# Patient Record
Sex: MALE | NOT HISPANIC OR LATINO | Employment: FULL TIME | URBAN - METROPOLITAN AREA
[De-identification: names, ages, dates, MRNs, and addresses within clinical notes are randomized per-mention and may not be internally consistent; named-entity substitution may affect disease eponyms.]

---

## 2018-02-01 ENCOUNTER — APPOINTMENT (OUTPATIENT)
Dept: ADMISSIONS | Facility: MEDICAL CENTER | Age: 23
End: 2018-02-01
Attending: OTOLARYNGOLOGY
Payer: COMMERCIAL

## 2018-02-01 RX ORDER — ASCORBIC ACID 500 MG
500 TABLET ORAL
COMMUNITY
End: 2019-05-05

## 2018-02-09 ENCOUNTER — HOSPITAL ENCOUNTER (OUTPATIENT)
Facility: MEDICAL CENTER | Age: 23
End: 2018-02-09
Attending: OTOLARYNGOLOGY | Admitting: OTOLARYNGOLOGY
Payer: COMMERCIAL

## 2018-02-09 VITALS
HEIGHT: 70 IN | HEART RATE: 56 BPM | BODY MASS INDEX: 22.95 KG/M2 | DIASTOLIC BLOOD PRESSURE: 65 MMHG | SYSTOLIC BLOOD PRESSURE: 122 MMHG | OXYGEN SATURATION: 98 % | RESPIRATION RATE: 16 BRPM | TEMPERATURE: 97.3 F | WEIGHT: 160.27 LBS

## 2018-02-09 PROCEDURE — 501838 HCHG SUTURE GENERAL: Performed by: OTOLARYNGOLOGY

## 2018-02-09 PROCEDURE — 160002 HCHG RECOVERY MINUTES (STAT): Performed by: OTOLARYNGOLOGY

## 2018-02-09 PROCEDURE — 700101 HCHG RX REV CODE 250

## 2018-02-09 PROCEDURE — 700111 HCHG RX REV CODE 636 W/ 250 OVERRIDE (IP)

## 2018-02-09 PROCEDURE — A6402 STERILE GAUZE <= 16 SQ IN: HCPCS | Performed by: OTOLARYNGOLOGY

## 2018-02-09 PROCEDURE — 160048 HCHG OR STATISTICAL LEVEL 1-5: Performed by: OTOLARYNGOLOGY

## 2018-02-09 PROCEDURE — 500331 HCHG COTTONOID, SURG PATTIE: Performed by: OTOLARYNGOLOGY

## 2018-02-09 PROCEDURE — 160041 HCHG SURGERY MINUTES - EA ADDL 1 MIN LEVEL 4: Performed by: OTOLARYNGOLOGY

## 2018-02-09 PROCEDURE — 160035 HCHG PACU - 1ST 60 MINS PHASE I: Performed by: OTOLARYNGOLOGY

## 2018-02-09 PROCEDURE — 160036 HCHG PACU - EA ADDL 30 MINS PHASE I: Performed by: OTOLARYNGOLOGY

## 2018-02-09 PROCEDURE — 88311 DECALCIFY TISSUE: CPT

## 2018-02-09 PROCEDURE — 160029 HCHG SURGERY MINUTES - 1ST 30 MINS LEVEL 4: Performed by: OTOLARYNGOLOGY

## 2018-02-09 PROCEDURE — A9270 NON-COVERED ITEM OR SERVICE: HCPCS

## 2018-02-09 PROCEDURE — 700102 HCHG RX REV CODE 250 W/ 637 OVERRIDE(OP)

## 2018-02-09 PROCEDURE — 502573 HCHG PACK, ENT: Performed by: OTOLARYNGOLOGY

## 2018-02-09 PROCEDURE — 110454 HCHG SHELL REV 250: Performed by: OTOLARYNGOLOGY

## 2018-02-09 PROCEDURE — 160009 HCHG ANES TIME/MIN: Performed by: OTOLARYNGOLOGY

## 2018-02-09 PROCEDURE — 501404 HCHG SPLINT, NASAL DOYLE AIRWAY: Performed by: OTOLARYNGOLOGY

## 2018-02-09 PROCEDURE — 500125 HCHG BOVIE, HANDLE: Performed by: OTOLARYNGOLOGY

## 2018-02-09 PROCEDURE — 88305 TISSUE EXAM BY PATHOLOGIST: CPT

## 2018-02-09 RX ORDER — LIDOCAINE HYDROCHLORIDE AND EPINEPHRINE 10; 10 MG/ML; UG/ML
INJECTION, SOLUTION INFILTRATION; PERINEURAL
Status: DISCONTINUED | OUTPATIENT
Start: 2018-02-09 | End: 2018-02-09 | Stop reason: HOSPADM

## 2018-02-09 RX ORDER — SODIUM CHLORIDE, SODIUM LACTATE, POTASSIUM CHLORIDE, CALCIUM CHLORIDE 600; 310; 30; 20 MG/100ML; MG/100ML; MG/100ML; MG/100ML
INJECTION, SOLUTION INTRAVENOUS CONTINUOUS
Status: DISCONTINUED | OUTPATIENT
Start: 2018-02-09 | End: 2018-02-09 | Stop reason: HOSPADM

## 2018-02-09 RX ORDER — OXYCODONE HCL 5 MG/5 ML
SOLUTION, ORAL ORAL
Status: COMPLETED
Start: 2018-02-09 | End: 2018-02-09

## 2018-02-09 RX ORDER — CEPHALEXIN 500 MG/1
500 CAPSULE ORAL 2 TIMES DAILY
Qty: 20 CAP | Refills: 0 | Status: SHIPPED | OUTPATIENT
Start: 2018-02-09 | End: 2019-05-05

## 2018-02-09 RX ORDER — OXYMETAZOLINE HYDROCHLORIDE 0.05 G/100ML
SPRAY NASAL
Status: DISCONTINUED
Start: 2018-02-09 | End: 2018-02-09 | Stop reason: HOSPADM

## 2018-02-09 RX ORDER — ACETAMINOPHEN 500 MG
1000 TABLET ORAL EVERY 6 HOURS
Status: DISCONTINUED | OUTPATIENT
Start: 2018-02-09 | End: 2018-02-09 | Stop reason: HOSPADM

## 2018-02-09 RX ORDER — LIDOCAINE HYDROCHLORIDE 10 MG/ML
INJECTION, SOLUTION EPIDURAL; INFILTRATION; INTRACAUDAL; PERINEURAL
Status: DISCONTINUED
Start: 2018-02-09 | End: 2018-02-09 | Stop reason: HOSPADM

## 2018-02-09 RX ORDER — EPINEPHRINE 1 MG/ML
INJECTION INTRAMUSCULAR; INTRAVENOUS; SUBCUTANEOUS
Status: DISCONTINUED
Start: 2018-02-09 | End: 2018-02-09 | Stop reason: HOSPADM

## 2018-02-09 RX ORDER — OXYMETAZOLINE HYDROCHLORIDE 0.05 G/100ML
SPRAY NASAL
Status: COMPLETED
Start: 2018-02-09 | End: 2018-02-09

## 2018-02-09 RX ORDER — BACITRACIN ZINC 500 [USP'U]/G
OINTMENT TOPICAL
Status: DISCONTINUED
Start: 2018-02-09 | End: 2018-02-09 | Stop reason: HOSPADM

## 2018-02-09 RX ORDER — MIDAZOLAM HYDROCHLORIDE 1 MG/ML
INJECTION INTRAMUSCULAR; INTRAVENOUS
Status: DISCONTINUED
Start: 2018-02-09 | End: 2018-02-09 | Stop reason: HOSPADM

## 2018-02-09 RX ADMIN — OXYMETAZOLINE HYDROCHLORIDE 2 SPRAY: 5 SPRAY NASAL at 08:15

## 2018-02-09 RX ADMIN — SODIUM CHLORIDE, SODIUM LACTATE, POTASSIUM CHLORIDE, CALCIUM CHLORIDE 1000 ML: 600; 310; 30; 20 INJECTION, SOLUTION INTRAVENOUS at 08:15

## 2018-02-09 RX ADMIN — OXYCODONE HYDROCHLORIDE 5 MG: 5 SOLUTION ORAL at 11:55

## 2018-02-09 ASSESSMENT — PAIN SCALES - GENERAL
PAINLEVEL_OUTOF10: 4
PAINLEVEL_OUTOF10: 4
PAINLEVEL_OUTOF10: 5
PAINLEVEL_OUTOF10: 4
PAINLEVEL_OUTOF10: 0
PAINLEVEL_OUTOF10: 4

## 2018-02-09 NOTE — OR SURGEON
Immediate Post OP Note    PreOp Diagnosis: persistent sinusitis    PostOp Diagnosis: same    Procedure(s):  TURBINATE REDUCTION - RESECTION WITH SUBMUCOSAL APPROACH - Wound Class: Clean Contaminated  ETHMOIDECTOMY - ENDOSCOPIC, PARTIAL (ANTERIOR) - Wound Class: Clean Contaminated  ANTROSTOMY - ENDOSCOPIC MAXILLARY WITH MAXILLARY TISSUE REMOVAL - Wound Class: Clean Contaminated    Surgeon(s):  Aston Pool M.D.    Anesthesiologist/Type of Anesthesia:  Anesthesiologist: Brina Dia M.D./General    Surgical Staff:  Circulator: Jackelyn Cooper R.N.  Scrub Person: Sobeida Thomas    Specimens:  * No specimens in log *    Estimated Blood Loss: 30 ml    Findings: windows occluded    Complications: none        2/9/2018 11:11 AM Aston Pool

## 2018-02-09 NOTE — OR NURSING
1110 Pt arrived from OR with Dr. Dia.  Pt VSS, PIV infusing without issue. Pt with mild bloody drainage from nose, drip pad provided.    1150 pt waking up, tolerating sips of ginger ale well.  Pt denies nausea, admits to pain in nose.  Medicated with PO percocet.    1157 Mother brought to bedside.   1202 Pt medicated with IV fentanyl per order.   1220 Pt medicated again with IV fentanyl per order.   1240 Discharge instructions reviewed with pt and mother.  Answered all questions and concerns.   1252 Pt up to side of bed to get dressed, tolerated well.   1300 Pt meets d/c criteria, feels ready to go home.  PIV removed, pt tolerated well.    1305 Pt declined wheelchair and left ambulating with family to d/c home.

## 2018-02-09 NOTE — DISCHARGE INSTRUCTIONS
ACTIVITY: Rest and take it easy for the first 24 hours.  A responsible adult is recommended to remain with you during that time.  It is normal to feel sleepy.  We encourage you to not do anything that requires balance, judgment or coordination.    MILD FLU-LIKE SYMPTOMS ARE NORMAL. YOU MAY EXPERIENCE GENERALIZED MUSCLE ACHES, THROAT IRRITATION, HEADACHE AND/OR SOME NAUSEA.    FOR 24 HOURS DO NOT:  Drive, operate machinery or run household appliances.  Drink beer or alcoholic beverages.   Make important decisions or sign legal documents.    SPECIAL INSTRUCTIONS: SEE ATTACHED SHEET    DIET: To avoid nausea, slowly advance diet as tolerated, avoiding spicy or greasy foods for the first day.  Add more substantial food to your diet according to your physician's instructions.  Babies can be fed formula or breast milk as soon as they are hungry.  INCREASE FLUIDS AND FIBER TO AVOID CONSTIPATION.    SURGICAL DRESSING/BATHING: May shower starting tomorrow.      FOLLOW-UP APPOINTMENT:  A follow-up appointment should be arranged with your doctor in as scheduled; call to schedule.    You should CALL YOUR PHYSICIAN if you develop:  Fever greater than 101 degrees F.  Pain not relieved by medication, or persistent nausea or vomiting.  Excessive bleeding (blood soaking through dressing) or unexpected drainage from the wound.  Extreme redness or swelling around the incision site, drainage of pus or foul smelling drainage.  Inability to urinate or empty your bladder within 8 hours.  Problems with breathing or chest pain.    You should call 911 if you develop problems with breathing or chest pain.  If you are unable to contact your doctor or surgical center, you should go to the nearest emergency room or urgent care center.  Physician's telephone #: [phone]    If any questions arise, call your doctor.  If your doctor is not available, please feel free to call the Surgical Center at [phone].  The Center is open Monday  through Friday from 7AM to 7PM.  You can also call the HEALTH HOTLINE open 24 hours/day, 7 days/week and speak to a nurse at [phone], or toll free at [phone].    A registered nurse may call you a few days after your surgery to see how you are doing after your procedure.    MEDICATIONS: Resume taking daily medication.  Take prescribed pain medication with food.  If no medication is prescribed, you may take non-aspirin pain medication if needed.  PAIN MEDICATION CAN BE VERY CONSTIPATING.  Take a stool softener or laxative such as senokot, pericolace, or milk of magnesia if needed.    Prescription given for Keflex (2 times daily) and _______________ .  Last pain medication given at 11:50 am (next dose after 3:50 pm).    If your physician has prescribed pain medication that includes Acetaminophen (Tylenol), do not take additional Acetaminophen (Tylenol) while taking the prescribed medication.    Depression / Suicide Risk    As you are discharged from this [facility] facility, it is important to learn how to keep safe from harming yourself.    Recognize the warning signs:  · Abrupt changes in personality, positive or negative- including increase in energy   · Giving away possessions  · Change in eating patterns- significant weight changes-  positive or negative  · Change in sleeping patterns- unable to sleep or sleeping all the time   · Unwillingness or inability to communicate  · Depression  · Unusual sadness, discouragement and loneliness  · Talk of wanting to die  · Neglect of personal appearance   · Rebelliousness- reckless behavior  · Withdrawal from people/activities they love  · Confusion- inability to concentrate     If you or a loved one observes any of these behaviors or has concerns about self-harm, here's what you can do:  · Talk about it- your feelings and reasons for harming yourself  · Remove any means that you might use to hurt yourself (examples: pills, rope, extension cords,  firearm)  · Get professional help from the community (Mental Health, Substance Abuse, psychological counseling)  · Do not be alone:Call your Safe Contact- someone whom you trust who will be there for you.  · Call your local CRISIS HOTLINE 203-7982 or 160-514-4295  · Call your local Children's Mobile Crisis Response Team [place] (113) 795-7600 or www.WePopp  · Call the toll free National Suicide Prevention Hotlines   · National Suicide Prevention Lifeline 998-840-CXIS (4392)  · National Hope Line Network 800-SUICIDE (145-5428)

## 2018-02-16 NOTE — OP REPORT
DATE OF SERVICE:  02/09/2018    PREOPERATIVE DIAGNOSES:  Bilateral sinusitis and bilateral middle turbinate   hypertrophy.    POSTOPERATIVE DIAGNOSES:  Bilateral sinusitis and bilateral middle turbinate   hypertrophy.    PROCEDURES PERFORMED:  Bilateral submucous resection of the middle turbinates,   bilateral endoscopic ethmoidectomy, and bilateral endoscopic maxillary   antrostomy with tissue removal.    SURGEON:  Aston Pool MD    ANESTHESIA:  General.    INDICATIONS:  The patient is a very nice 22-year-old male who has had a   previous septoplasty and some previous sinus surgery.  The middle turbinate   has lateralized over his windows and he is getting recurrent sinusitis.    DESCRIPTION OF PROCEDURE:  The patient was placed on the operating room table   in supine position.  After adequate general anesthesia was administered, the   right and left nasal cavities were anesthetized with 1% lidocaine with   1:100,000 epinephrine using approximately 6 mL with a 25-gauge spinal needle.    Afrin-soaked pledgets were placed in the nose bilaterally.  The right and   left middle turbinates were trimmed in a submucous fashion using endoscopic   scissors to the right and left and a straight and upbiting cutting forceps.    This uncovered the ostiomeatal complex area and the natural ostia.  The   natural ostia was widened with a straight cutting forceps posteriorly and a   sidebiter and backbiter posteriorly.  The window was widened to approximately   8 mm x 1 cm and tissue was removed from the intramaxillary sinus on both   sides.  The anterior ethmoid was accentuated with a Galloway tip suction and   ethmoidectomy was then performed.  Surgiflo was placed in the surgical area   and the patient was awoken from anesthesia and brought to the recovery room in   satisfactory condition.  There were no intraoperative complications.       ____________________________________     MD KEENAN GRADY / EJ    DD:   02/16/2018 10:40:11  DT:  02/16/2018 11:17:57    D#:  [id number]  Job#:  [id number]

## 2019-01-30 ENCOUNTER — OFFICE VISIT (OUTPATIENT)
Dept: URGENT CARE | Facility: MEDICAL CENTER | Age: 24
End: 2019-01-30
Payer: COMMERCIAL

## 2019-01-30 VITALS
WEIGHT: 181 LBS | SYSTOLIC BLOOD PRESSURE: 140 MMHG | DIASTOLIC BLOOD PRESSURE: 80 MMHG | RESPIRATION RATE: 16 BRPM | TEMPERATURE: 98.5 F | BODY MASS INDEX: 25.97 KG/M2 | OXYGEN SATURATION: 97 % | HEART RATE: 89 BPM

## 2019-01-30 DIAGNOSIS — J02.9 VIRAL PHARYNGITIS: ICD-10-CM

## 2019-01-30 LAB
INT CON NEG: NEGATIVE
INT CON POS: POSITIVE
S PYO AG THROAT QL: NEGATIVE

## 2019-01-30 PROCEDURE — 87880 STREP A ASSAY W/OPTIC: CPT | Performed by: PHYSICIAN ASSISTANT

## 2019-01-30 PROCEDURE — 99203 OFFICE O/P NEW LOW 30 MIN: CPT | Performed by: PHYSICIAN ASSISTANT

## 2019-01-30 ASSESSMENT — ENCOUNTER SYMPTOMS
ABDOMINAL PAIN: 0
NAUSEA: 0
SWOLLEN GLANDS: 1
EYE PAIN: 0
HEADACHES: 0
FEVER: 1
BLURRED VISION: 0
VOMITING: 0
PALPITATIONS: 0
MYALGIAS: 1
DIZZINESS: 0
SORE THROAT: 1
COUGH: 0
SHORTNESS OF BREATH: 0
CONSTIPATION: 0
DIARRHEA: 0

## 2019-01-30 NOTE — LETTER
January 30, 2019         Patient: Kali Merino   YOB: 1995   Date of Visit: 1/30/2019           To Whom it May Concern:    Kali Merino was seen in my clinic on 1/30/2019.     If you have any questions or concerns, please don't hesitate to call.        Sincerely,           Lisa Arriaga P.A.-C.  Electronically Signed

## 2019-01-30 NOTE — PROGRESS NOTES
Subjective:      Kali Merino is a 23 y.o. male who presents with Pharyngitis (5 days)      Pharyngitis    This is a new problem. The current episode started in the past 7 days (Started 4-5 days ago). The problem has been unchanged. Neither side of throat is experiencing more pain than the other. Maximum temperature: Subjective fever. The pain is mild. Associated symptoms include congestion and swollen glands. Pertinent negatives include no abdominal pain, coughing, diarrhea, drooling, ear pain, headaches, plugged ear sensation, shortness of breath or vomiting. He has had no exposure to strep or mono. He has tried nothing for the symptoms.       Review of Systems   Constitutional: Positive for fever (Subjective) and malaise/fatigue.   HENT: Positive for congestion and sore throat. Negative for drooling and ear pain.    Eyes: Negative for blurred vision and pain.   Respiratory: Negative for cough and shortness of breath.    Cardiovascular: Negative for chest pain and palpitations.   Gastrointestinal: Negative for abdominal pain, constipation, diarrhea, nausea and vomiting.   Musculoskeletal: Positive for myalgias.   Skin: Negative for rash.   Neurological: Negative for dizziness and headaches.       PMH:  has a past medical history of Anesthesia; Bronchitis (1/15/15); Chronic sinus infection (1/15/15); Heart murmur; Indigestion; and Palpitations. He also has no past medical history of Abnormal EKG; Hypertension; Hypertrophic cardiomyopathy (HCC); PSVT (paroxysmal supraventricular tachycardia) ([facility]); or Syncope.  MEDS:   Current Outpatient Prescriptions:   •  ascorbic acid (ASCORBIC ACID) 500 MG Tab, Take 500 mg by mouth 2 Times a Day., Disp: , Rfl:   •  MAGNESIUM-ZINC PO, Take  by mouth every day., Disp: , Rfl:   •  cephALEXin (KEFLEX) 500 MG Cap, Take 1 Cap by mouth 2 times a day. (Patient not taking: Reported on 1/30/2019), Disp: 20 Cap, Rfl: 0  •  Fluticasone Propionate (FLONASE NA), Spray  in nose as  needed., Disp: , Rfl:   ALLERGIES: No Known Allergies  SURGHX:   Past Surgical History:   Procedure Laterality Date   • TURBINATE REDUCTION Bilateral 2/9/2018    Procedure: TURBINATE REDUCTION - RESECTION WITH SUBMUCOSAL APPROACH;  Surgeon: Aston Pool M.D.;  Location: SURGERY SAME DAY [facility];  Service: Ent   • ETHMOIDECTOMY Bilateral 2/9/2018    Procedure: ETHMOIDECTOMY - ENDOSCOPIC, PARTIAL (ANTERIOR);  Surgeon: Aston Pool M.D.;  Location: SURGERY SAME DAY [facility];  Service: Ent   • ANTROSTOMY Bilateral 2/9/2018    Procedure: ANTROSTOMY - ENDOSCOPIC MAXILLARY WITH MAXILLARY TISSUE REMOVAL;  Surgeon: Aston Pool M.D.;  Location: SURGERY SAME DAY [facility];  Service: Ent   • SEPTOPLASTY  1/16/2015    Performed by Bere Hickey M.D. at [facility]   • TURBINOPLASTY  1/16/2015    Performed by Bere Hickey M.D. at [facility]   • SPHENOIDECTOMY  1/16/2015    Performed by Bere Hickey M.D. at [facility]   • ANTROSTOMY  1/16/2015    Performed by Bere Hickey M.D. at [facility]   • ETHMOIDECTOMY  1/16/2015    Performed by Bere Hickey M.D. at [facility]   • NODULE EXCISION  2007    vocal cord   • TONSILLECTOMY AND ADENOIDECTOMY  2001   • MYRINGOTOMY  2000    with tubes     SOCHX:  reports that he has never smoked. He has never used smokeless tobacco. He reports that he uses drugs, including Marijuana. He reports that he does not drink alcohol.  FH: Family history was reviewed, no pertinent findings to report     Objective:     /80   Pulse 89   Temp 36.9 °C (98.5 °F)   Resp 16   Wt 82.1 kg (181 lb)   SpO2 97%   BMI 25.97 kg/m²      Physical Exam   Constitutional: He is oriented to person, place, and time. He appears well-developed and well-nourished.   HENT:   Head: Normocephalic and atraumatic.   Right Ear: Tympanic membrane, external ear and ear canal normal.   Left Ear: Tympanic  membrane, external ear and ear canal normal.   Nose: Mucosal edema present. Right sinus exhibits no maxillary sinus tenderness and no frontal sinus tenderness. Left sinus exhibits no maxillary sinus tenderness and no frontal sinus tenderness.   Mouth/Throat: Uvula is midline and mucous membranes are normal. Posterior oropharyngeal erythema present.   Patient does not have tonsils   Eyes: Pupils are equal, round, and reactive to light. Conjunctivae are normal.   Neck: Normal range of motion.   Cardiovascular: Normal rate, regular rhythm and normal heart sounds.    No murmur heard.  Pulmonary/Chest: Effort normal and breath sounds normal. He has no wheezes.   Lymphadenopathy:     He has cervical adenopathy.   Neurological: He is alert and oriented to person, place, and time.   Skin: Skin is warm and dry. Capillary refill takes less than 2 seconds.   Psychiatric: He has a normal mood and affect. His behavior is normal. Judgment normal.   Vitals reviewed.      POCT Rapid Strep A - Negative    Assessment/Plan:     1. Viral pharyngitis  - POCT Rapid Strep A  - Supportive care discussed to include salt water gargles, throat lozenges, and increased fluid intake        Differential Diagnosis, natural history, and supportive care discussed. Return to the Urgent Care or follow up with your PCP if symptoms fail to resolve, or for any new or worsening symptoms. Emergency room precautions discussed. Patient and/or family appears understanding of information.

## 2019-05-05 PROBLEM — R09.02 HYPOXIA: Status: ACTIVE | Noted: 2019-05-05

## 2019-05-05 PROBLEM — F19.90 DRUG USE: Status: ACTIVE | Noted: 2019-05-05

## 2019-05-05 PROBLEM — F10.10 EXCESSIVE DRINKING OF ALCOHOL: Status: ACTIVE | Noted: 2019-05-05

## 2019-05-06 PROBLEM — F19.90 DRUG USE: Status: RESOLVED | Noted: 2019-05-05 | Resolved: 2019-05-06

## 2019-05-06 PROBLEM — F10.10 EXCESSIVE DRINKING OF ALCOHOL: Status: RESOLVED | Noted: 2019-05-05 | Resolved: 2019-05-06

## 2019-05-06 PROBLEM — R09.02 HYPOXIA: Status: RESOLVED | Noted: 2019-05-05 | Resolved: 2019-05-06

## 2021-05-26 ENCOUNTER — HOSPITAL ENCOUNTER (OUTPATIENT)
Dept: HOSPITAL 8 - CFH | Age: 26
Discharge: HOME | End: 2021-05-26
Attending: PHYSICIAN ASSISTANT
Payer: COMMERCIAL

## 2021-05-26 DIAGNOSIS — N63.42: ICD-10-CM

## 2021-05-26 DIAGNOSIS — R22.2: ICD-10-CM

## 2021-05-26 DIAGNOSIS — N62: Primary | ICD-10-CM

## 2021-05-26 PROCEDURE — G0279 TOMOSYNTHESIS, MAMMO: HCPCS

## 2021-05-26 PROCEDURE — 76642 ULTRASOUND BREAST LIMITED: CPT

## 2021-05-26 PROCEDURE — 77062 BREAST TOMOSYNTHESIS BI: CPT

## 2022-03-11 ENCOUNTER — OFFICE VISIT (OUTPATIENT)
Dept: URGENT CARE | Facility: CLINIC | Age: 27
End: 2022-03-11
Payer: COMMERCIAL

## 2022-03-11 VITALS
OXYGEN SATURATION: 96 % | HEART RATE: 86 BPM | WEIGHT: 195.2 LBS | SYSTOLIC BLOOD PRESSURE: 134 MMHG | TEMPERATURE: 99 F | BODY MASS INDEX: 27.94 KG/M2 | RESPIRATION RATE: 16 BRPM | HEIGHT: 70 IN | DIASTOLIC BLOOD PRESSURE: 74 MMHG

## 2022-03-11 DIAGNOSIS — J32.9 BACTERIAL SINUSITIS: ICD-10-CM

## 2022-03-11 DIAGNOSIS — J20.9 ACUTE BRONCHITIS, UNSPECIFIED ORGANISM: ICD-10-CM

## 2022-03-11 DIAGNOSIS — B96.89 BACTERIAL SINUSITIS: ICD-10-CM

## 2022-03-11 PROCEDURE — 99214 OFFICE O/P EST MOD 30 MIN: CPT

## 2022-03-11 RX ORDER — AMOXICILLIN AND CLAVULANATE POTASSIUM 875; 125 MG/1; MG/1
1 TABLET, FILM COATED ORAL 2 TIMES DAILY
Qty: 10 TABLET | Refills: 0 | Status: SHIPPED | OUTPATIENT
Start: 2022-03-11 | End: 2022-03-16

## 2022-03-11 RX ORDER — PREDNISONE 20 MG/1
20 TABLET ORAL DAILY
Qty: 5 TABLET | Refills: 0 | Status: SHIPPED | OUTPATIENT
Start: 2022-03-11 | End: 2022-03-16

## 2022-03-11 RX ORDER — DEXTROAMPHETAMINE SACCHARATE, AMPHETAMINE ASPARTATE MONOHYDRATE, DEXTROAMPHETAMINE SULFATE AND AMPHETAMINE SULFATE 3.75; 3.75; 3.75; 3.75 MG/1; MG/1; MG/1; MG/1
15 CAPSULE, EXTENDED RELEASE ORAL EVERY MORNING
COMMUNITY
Start: 2022-03-08

## 2022-03-11 ASSESSMENT — ENCOUNTER SYMPTOMS
HOARSE VOICE: 0
HEADACHES: 1
CHILLS: 0
SINUS PRESSURE: 1
COUGH: 1
NAUSEA: 0
SHORTNESS OF BREATH: 0
VOMITING: 0
MUSCULOSKELETAL NEGATIVE: 1
SORE THROAT: 1

## 2022-03-12 NOTE — PROGRESS NOTES
Subjective     Kali Merino is a 26 y.o. male who presents with Sinusitis (5x weeks, post nasal drip) and Otalgia (Both, left more than right /)    HPI:    Patient presents with nasal congestion and runny nose that started 5 weeks ago. Three weeks ago, ear pain bilateral with left more. This is also accompanied by sore throat, and post nasal drip. He reports sinus pressure, used Sudafed with some relief.  He further reports occasional SOB and/or wheezes he denies any fever chills, nausea, vomiting, diarrhea.  He reports having COVID in January 2022. Patient reports revious sinus surgery.        Sinusitis  This is a chronic problem. The current episode started more than 1 month ago. The problem has been gradually worsening since onset. There has been no fever. Associated symptoms include coughing (on waking up), ear pain, headaches (sinus), sinus pressure and a sore throat. Pertinent negatives include no chills, hoarse voice or shortness of breath. Past treatments include saline sprays. The treatment provided no relief.   Otalgia   Associated symptoms include coughing (on waking up), headaches (sinus) and a sore throat. Pertinent negatives include no vomiting.     PMH:  has a past medical history of Anesthesia, Bronchitis (1/15/15), Chronic sinus infection (1/15/15), Heart murmur, Indigestion, and Palpitations.    He has no past medical history of Abnormal EKG, Hypertension, Hypertrophic cardiomyopathy (HCC), PSVT (paroxysmal supraventricular tachycardia) ([facility]), or Syncope.  MEDS:   Current Outpatient Medications:   •  amphetamine-dextroamphetamine (ADDERALL XR) 15 MG XR capsule, Take 15 mg by mouth every morning. Take 1 capsule by mouth every morning, Disp: , Rfl:   •  amoxicillin-clavulanate (AUGMENTIN) 875-125 MG Tab, Take 1 Tablet by mouth 2 times a day for 5 days., Disp: 10 Tablet, Rfl: 0  •  predniSONE (DELTASONE) 20 MG Tab, Take 1 Tablet by mouth every day for 5 days., Disp: 5 Tablet, Rfl:  0  ALLERGIES: No Known Allergies  SURGHX:   Past Surgical History:   Procedure Laterality Date   • TURBINATE REDUCTION Bilateral 2/9/2018    Procedure: TURBINATE REDUCTION - RESECTION WITH SUBMUCOSAL APPROACH;  Surgeon: Aston Pool M.D.;  Location: SURGERY SAME DAY [facility];  Service: Ent   • ETHMOIDECTOMY Bilateral 2/9/2018    Procedure: ETHMOIDECTOMY - ENDOSCOPIC, PARTIAL (ANTERIOR);  Surgeon: Aston Pool M.D.;  Location: SURGERY SAME DAY [facility];  Service: Ent   • ANTROSTOMY Bilateral 2/9/2018    Procedure: ANTROSTOMY - ENDOSCOPIC MAXILLARY WITH MAXILLARY TISSUE REMOVAL;  Surgeon: Aston Pool M.D.;  Location: SURGERY SAME DAY [facility];  Service: Ent   • SEPTOPLASTY  1/16/2015    Performed by Bere Hickey M.D. at [facility]   • TURBINOPLASTY  1/16/2015    Performed by Bere Hickey M.D. at [facility]   • SPHENOIDECTOMY  1/16/2015    Performed by Bere Hickey M.D. at [facility]   • ANTROSTOMY  1/16/2015    Performed by Bere Hickey M.D. at [facility]   • ETHMOIDECTOMY  1/16/2015    Performed by Bere Hickey M.D. at [facility]   • NODULE EXCISION  2007    vocal cord   • TONSILLECTOMY AND ADENOIDECTOMY  2001   • MYRINGOTOMY  2000    with tubes     SOCHX:  reports that he has never smoked. He has never used smokeless tobacco. He reports current alcohol use. He reports current drug use. Drug: Marijuana.  FH: Reviewed with patient, not pertinent to this visit.       Review of Systems   Constitutional: Negative for chills.   HENT: Positive for ear pain, sinus pressure and sore throat. Negative for hoarse voice.    Respiratory: Positive for cough (on waking up). Negative for shortness of breath.    Gastrointestinal: Negative for nausea and vomiting.   Musculoskeletal: Negative.    Neurological: Positive for headaches (sinus).         Objective     /74 (BP Location: Right arm, Patient  "Position: Sitting, BP Cuff Size: Adult long)   Pulse 86   Temp 37.2 °C (99 °F) (Temporal)   Resp 16   Ht 1.778 m (5' 10\")   Wt 88.5 kg (195 lb 3.2 oz)   SpO2 96%   BMI 28.01 kg/m²      Physical Exam  HENT:      Right Ear: Tympanic membrane, ear canal and external ear normal.      Left Ear: Tympanic membrane, ear canal and external ear normal.      Nose: Congestion and rhinorrhea present.      Right Sinus: Maxillary sinus tenderness present. No frontal sinus tenderness.      Left Sinus: Maxillary sinus tenderness present. No frontal sinus tenderness.   Eyes:      Extraocular Movements: Extraocular movements intact.   Cardiovascular:      Rate and Rhythm: Normal rate and regular rhythm.      Pulses: Normal pulses.      Heart sounds: Normal heart sounds.   Pulmonary:      Effort: Pulmonary effort is normal.      Breath sounds: Normal breath sounds.   Musculoskeletal:         General: Normal range of motion.      Cervical back: Normal range of motion.   Skin:     General: Skin is warm and dry.   Neurological:      General: No focal deficit present.      Mental Status: He is alert.   Psychiatric:         Mood and Affect: Mood normal.         Behavior: Behavior normal.              Assessment & Plan        1. Bacterial sinusitis    - amoxicillin-clavulanate (AUGMENTIN) 875-125 MG Tab; Take 1 Tablet by mouth 2 times a day for 5 days.  Dispense: 10 Tablet; Refill: 0    2. Acute bronchitis, unspecified organism    Patient's presentation is consistent with bacterial sinusitis and acute bronchitis.  Discussed treatment plan, patient is agreeable and verbalized understanding.  Patient is started on oral antibiotics.  He is placed on oral steroids, as he had responded to this positively in the past.     Discussed supportive treatment at home:  Rest, increase oral fluid intake, use of humidifier, steam inhalation, warm saline gargles.  OTC Tylenol or Motrin for fever/discomfort.  OTC Supportive Care for Congestion - saline " nasal spray or neti pot     Follow-up with PCP      Return to clinic or go to the ED if symptoms worsen or fail to improve, or if the patient should develop worsening/increasing cough, persistent shortness of breath, wheezing, chest pain, ear pain, sore throat, fever/chills, and/or any concerning symptoms.     Electronically Signed by LOLA Rodríguez

## 2022-04-05 ENCOUNTER — APPOINTMENT (OUTPATIENT)
Dept: URGENT CARE | Facility: CLINIC | Age: 27
End: 2022-04-05
Payer: COMMERCIAL

## 2022-04-06 ENCOUNTER — OFFICE VISIT (OUTPATIENT)
Dept: URGENT CARE | Facility: CLINIC | Age: 27
End: 2022-04-06
Payer: COMMERCIAL

## 2022-04-06 VITALS
DIASTOLIC BLOOD PRESSURE: 76 MMHG | HEIGHT: 70 IN | WEIGHT: 195 LBS | RESPIRATION RATE: 14 BRPM | BODY MASS INDEX: 27.92 KG/M2 | OXYGEN SATURATION: 99 % | SYSTOLIC BLOOD PRESSURE: 114 MMHG | HEART RATE: 55 BPM | TEMPERATURE: 97.7 F

## 2022-04-06 DIAGNOSIS — J01.40 ACUTE PANSINUSITIS, RECURRENCE NOT SPECIFIED: ICD-10-CM

## 2022-04-06 PROCEDURE — 99214 OFFICE O/P EST MOD 30 MIN: CPT | Performed by: NURSE PRACTITIONER

## 2022-04-06 RX ORDER — METHYLPREDNISOLONE 4 MG/1
TABLET ORAL
Qty: 21 TABLET | Refills: 0 | Status: SHIPPED | OUTPATIENT
Start: 2022-04-06

## 2022-04-06 RX ORDER — DOXYCYCLINE HYCLATE 100 MG
100 TABLET ORAL 2 TIMES DAILY
Qty: 20 TABLET | Refills: 0 | Status: SHIPPED | OUTPATIENT
Start: 2022-04-06 | End: 2022-04-16

## 2022-04-06 RX ORDER — DEXTROAMPHETAMINE SACCHARATE, AMPHETAMINE ASPARTATE MONOHYDRATE, DEXTROAMPHETAMINE SULFATE AND AMPHETAMINE SULFATE 2.5; 2.5; 2.5; 2.5 MG/1; MG/1; MG/1; MG/1
CAPSULE, EXTENDED RELEASE ORAL
COMMUNITY
End: 2022-04-06

## 2022-04-06 ASSESSMENT — ENCOUNTER SYMPTOMS
CHILLS: 0
NAUSEA: 0
SORE THROAT: 1
FEVER: 0
COUGH: 0
SENSORY CHANGE: 0
PALPITATIONS: 0
EYE DISCHARGE: 0
WHEEZING: 0
MYALGIAS: 0
DIZZINESS: 0
SINUS PAIN: 1
EYE REDNESS: 0
SHORTNESS OF BREATH: 0
HEADACHES: 1

## 2022-04-06 ASSESSMENT — LIFESTYLE VARIABLES: SUBSTANCE_ABUSE: 0

## 2022-04-06 NOTE — PROGRESS NOTES
Kali Merino is a 26 y.o. male who presents for Sinus Problem (X3 weeks ) and Nasal Congestion      HPI Kali is a 26-year-old male who presents with sinus pain and pressure with nasal congestion, headache, thick drainage in the back of his throat x3 weeks.  He was seen previously in urgent care and given a 5-day course of Augmentin ( 03/11/22) .  He initially felt better but as soon as he stopped the Augmentin his symptoms returned and are worse this time.  He is using a humidifier and an over-the-counter nasal steroid spray with intermittent Benadryl.  He is also done some sinus irrigations as needed.  He denies fever, chills, dizziness.  He has a history of bacterial sinusitis.  He has a history of septoplasty and sinus surgery.  He also has seasonal allergies.    Review of Systems   Constitutional: Negative for chills, fever and malaise/fatigue.   HENT: Positive for congestion, sinus pain and sore throat. Negative for ear pain.    Eyes: Negative for discharge and redness.   Respiratory: Negative for cough, shortness of breath and wheezing.    Cardiovascular: Negative for chest pain and palpitations.   Gastrointestinal: Negative for nausea.   Musculoskeletal: Negative for myalgias.   Neurological: Positive for headaches. Negative for dizziness and sensory change.   Endo/Heme/Allergies: Positive for environmental allergies.   Psychiatric/Behavioral: Negative for substance abuse.       Allergies:     No Known Allergies    PMSFS Hx:  Past Medical History:   Diagnosis Date   • Anesthesia     PONV   • Bronchitis 1/15/15    reports hx of   • Chronic sinus infection 1/15/15    being treated currently   • Heart murmur    • Indigestion    • Palpitations      Past Surgical History:   Procedure Laterality Date   • TURBINATE REDUCTION Bilateral 2/9/2018    Procedure: TURBINATE REDUCTION - RESECTION WITH SUBMUCOSAL APPROACH;  Surgeon: Aston Pool M.D.;  Location: SURGERY SAME DAY [facility];  Service:  Ent   • ETHMOIDECTOMY Bilateral 2/9/2018    Procedure: ETHMOIDECTOMY - ENDOSCOPIC, PARTIAL (ANTERIOR);  Surgeon: Aston Pool M.D.;  Location: SURGERY SAME DAY [facility];  Service: Ent   • ANTROSTOMY Bilateral 2/9/2018    Procedure: ANTROSTOMY - ENDOSCOPIC MAXILLARY WITH MAXILLARY TISSUE REMOVAL;  Surgeon: Aston Pool M.D.;  Location: SURGERY SAME DAY [facility];  Service: Ent   • SEPTOPLASTY  1/16/2015    Performed by Bere Hickey M.D. at [facility]   • TURBINOPLASTY  1/16/2015    Performed by Bere Hickey M.D. at [facility]   • SPHENOIDECTOMY  1/16/2015    Performed by Bere Hickey M.D. at [facility]   • ANTROSTOMY  1/16/2015    Performed by Bere Hickey M.D. at [facility]   • ETHMOIDECTOMY  1/16/2015    Performed by Bere Hickey M.D. at [facility]   • NODULE EXCISION  2007    vocal cord   • TONSILLECTOMY AND ADENOIDECTOMY  2001   • MYRINGOTOMY  2000    with tubes     Family History   Problem Relation Age of Onset   • Thyroid Sister    • Heart Disease Other    • Hypertension Other      Social History     Tobacco Use   • Smoking status: Never Smoker   • Smokeless tobacco: Never Used   Substance Use Topics   • Alcohol use: Yes     Comment: occ       Problems:   Patient Active Problem List   Diagnosis   • Palpitations   • Tachycardia   • Hyperthyroidism   • Deviated nasal septum       Medications:   Current Outpatient Medications on File Prior to Visit   Medication Sig Dispense Refill   • amphetamine-dextroamphetamine (ADDERALL XR) 15 MG XR capsule Take 15 mg by mouth every morning. Take 1 capsule by mouth every morning     • amphetamine-dextroamphetamine XR (ADDERALL XR) 10 MG CAPSULE SR 24 HR Adderall XR 10 mg capsule,extended release   TAKE 1 CAPSULE BY MOUTH ONCE DAILY IN THE MORNING (Patient not taking: Reported on 4/6/2022)       No current facility-administered medications on file prior to visit.  "         Objective:     /76   Pulse (!) 55   Temp 36.5 °C (97.7 °F) (Temporal)   Resp 14   Ht 1.778 m (5' 10\")   Wt 88.5 kg (195 lb)   SpO2 99%   BMI 27.98 kg/m²     Physical Exam  Vitals and nursing note reviewed.   Constitutional:       General: He is not in acute distress.     Appearance: He is well-developed, well-groomed and normal weight. He is not toxic-appearing.   HENT:      Head: Normocephalic.      Right Ear: Hearing, tympanic membrane, ear canal and external ear normal.      Left Ear: Hearing, tympanic membrane, ear canal and external ear normal.      Nose:      Right Sinus: Frontal sinus tenderness present. No maxillary sinus tenderness.      Left Sinus: Frontal sinus tenderness present. No maxillary sinus tenderness.      Mouth/Throat:      Lips: Pink.      Mouth: Mucous membranes are moist.      Pharynx: Uvula midline. Oropharyngeal exudate present. No posterior oropharyngeal erythema.      Tonsils: No tonsillar abscesses.   Eyes:      General: Lids are normal.      Conjunctiva/sclera: Conjunctivae normal.   Neck:      Trachea: Trachea normal.   Cardiovascular:      Rate and Rhythm: Normal rate and regular rhythm.      Chest Wall: PMI is not displaced.      Pulses: Normal pulses.   Pulmonary:      Effort: Pulmonary effort is normal.      Breath sounds: Normal breath sounds.   Chest:   Breasts:      Right: No supraclavicular adenopathy.      Left: No supraclavicular adenopathy.       Abdominal:      Palpations: Abdomen is soft.   Musculoskeletal:      Cervical back: Full passive range of motion without pain, normal range of motion and neck supple.   Lymphadenopathy:      Head:      Right side of head: No tonsillar adenopathy.      Left side of head: No tonsillar adenopathy.      Cervical: No cervical adenopathy.      Upper Body:      Right upper body: No supraclavicular adenopathy.      Left upper body: No supraclavicular adenopathy.   Skin:     General: Skin is warm and dry.      " Capillary Refill: Capillary refill takes less than 2 seconds.   Neurological:      Mental Status: He is alert and oriented to person, place, and time.   Psychiatric:         Mood and Affect: Mood normal.         Speech: Speech normal.         Behavior: Behavior normal. Behavior is cooperative.         Thought Content: Thought content normal.         Assessment /Associated Orders:      1. Acute pansinusitis, recurrence not specified  doxycycline (VIBRAMYCIN) 100 MG Tab    methylPREDNISolone (MEDROL DOSEPAK) 4 MG Tablet Therapy Pack       Medical Decision Making:    Pt is clinically stable at today's acute urgent care visit.  No acute distress noted. Appropriate for outpatient management at this time.   Acute problem today with uncertain prognosis.   Educated in proper administration of medication(s) ordered today including safety, possible SE, risks, benefits, rationale and alternatives to therapy.     Advised to follow-up with the primary care provider for recheck, reevaluation, and consideration of further management if necessary.   Discussed management options (risks,benefits, and alternatives to treatment). Expressed understanding and the treatment plan was agreed upon. Questions were encouraged and answered   Return to urgent care prn if new or worsening sx or if there is no improvement in condition prn.    Educated in Red flags and indications to immediately call 911 or present to the Emergency Department.     I personally reviewed prior external notes and test results pertinent to today's visit.  I have independently reviewed and interpreted all diagnostics ordered during this urgent care acute visit.   Time spent evaluating this patient was at least 30 minutes and includes preparing for visit, counseling/education, exam and evaluation, obtaining history, independent interpretation, ordering lab/test/procedures,medication management and documentation.Time does not include separately billable procedures noted .

## 2022-05-03 ENCOUNTER — HOSPITAL ENCOUNTER (OUTPATIENT)
Facility: MEDICAL CENTER | Age: 27
End: 2022-05-03
Attending: FAMILY MEDICINE
Payer: COMMERCIAL

## 2022-05-03 PROCEDURE — 80307 DRUG TEST PRSMV CHEM ANLYZR: CPT

## 2022-05-03 PROCEDURE — G0480 DRUG TEST DEF 1-7 CLASSES: HCPCS

## 2022-05-04 LAB
FORWARD REASON: SPWHY: NORMAL
FORWARDED TO LAB: SPWHR: NORMAL
SPECIMEN SENT: SPWT1: NORMAL

## 2022-05-05 LAB
AMPHET CTO UR CFM-MCNC: POSITIVE NG/ML
BARBITURATES CTO UR CFM-MCNC: NEGATIVE NG/ML
BENZODIAZ CTO UR CFM-MCNC: NEGATIVE NG/ML
CANNABINOIDS CTO UR CFM-MCNC: POSITIVE NG/ML
COCAINE CTO UR CFM-MCNC: NEGATIVE NG/ML
DRUG COMMENT 753798: NORMAL
METHADONE CTO UR CFM-MCNC: NEGATIVE NG/ML
OPIATES CTO UR CFM-MCNC: NEGATIVE NG/ML
PCP CTO UR CFM-MCNC: NEGATIVE NG/ML
PROPOXYPH CTO UR CFM-MCNC: NEGATIVE NG/ML

## 2022-05-06 LAB
AMPHET UR CFM-MCNC: 1102 NG/ML
MDA UR CFM-MCNC: <200 NG/ML
MDEA UR CFM-MCNC: <200 NG/ML
MDMA UR CFM-MCNC: <200 NG/ML
METHAMPHET UR CFM-MCNC: <200 NG/ML
PHENTERMINE UR CFM-MCNC: <200 NG/ML

## 2022-05-09 LAB — THC UR CFM-MCNC: >500 NG/ML

## (undated) DEVICE — MASK ANESTHESIA ADULT  - (100/CA)

## (undated) DEVICE — GOWN WARMING STANDARD FLEX - (30/CA)

## (undated) DEVICE — PACK ENT OR - (2EA/CA)

## (undated) DEVICE — SET LEADWIRE 5 LEAD BEDSIDE DISPOSABLE ECG (1SET OF 5/EA)

## (undated) DEVICE — SUCTION INSTRUMENT YANKAUER BULBOUS TIP W/O VENT (50EA/CA)

## (undated) DEVICE — ELECTRODE 850 FOAM ADHESIVE - HYDROGEL RADIOTRNSPRNT (50/PK)

## (undated) DEVICE — SLEEVE, VASO, THIGH, MED

## (undated) DEVICE — CATHETER IV 20 GA X 1-1/4 ---SURG.& SDS ONLY--- (50EA/BX)

## (undated) DEVICE — NEPTUNE 4 PORT MANIFOLD - (20/PK)

## (undated) DEVICE — SODIUM CHL IRRIGATION 0.9% 1000ML (12EA/CA)

## (undated) DEVICE — SYRINGE DISP. 12 CC LL - (100/BX)

## (undated) DEVICE — BOVIE FOOT CONTROL SUCTION - 6IN 10FR (25EA/CA)

## (undated) DEVICE — ANTI-FOG SOLUTION - 60BTL/CA

## (undated) DEVICE — WATER IRRIGATION STERILE 1000ML (12EA/CA)

## (undated) DEVICE — ELECTRODE DUAL RETURN W/ CORD - (50/PK)

## (undated) DEVICE — LACTATED RINGERS INJ 1000 ML - (14EA/CA 60CA/PF)

## (undated) DEVICE — PATTIES SURG X-RAYCOTTONOID - 1/2 X 3 IN (200/CA)

## (undated) DEVICE — SPONGE KENNEDY SINUS 400426 - (10EA/PK)

## (undated) DEVICE — CATHETER IV 14 GA X 2 ---SURG.& SDS ONLY---(200EA/CA)

## (undated) DEVICE — CANISTER SUCTION 3000ML MECHANICAL FILTER AUTO SHUTOFF MEDI-VAC NONSTERILE LF DISP  (40EA/CA)

## (undated) DEVICE — SUTURE 3-0 ETHILON FS-1 - (36/BX) 30 INCH

## (undated) DEVICE — SPLINT NASAL DOYLE AIRWAY (2EA/ST)

## (undated) DEVICE — KIT  I.V. START (100EA/CA)

## (undated) DEVICE — SUTURE 4-0 VICRYL PLUS P-3 18 (12PK/BX)"

## (undated) DEVICE — HEAD HOLDER JUNIOR/ADULT

## (undated) DEVICE — CANISTER SUCTION RIGID RED 1500CC (40EA/CA)

## (undated) DEVICE — KIT ANESTHESIA W/CIRCUIT & 3/LT BAG W/FILTER (20EA/CA)

## (undated) DEVICE — TUBING CLEARLINK DUO-VENT - C-FLO (48EA/CA)

## (undated) DEVICE — SENSOR SPO2 NEO LNCS ADHESIVE (20/BX) SEE USER NOTES

## (undated) DEVICE — KIT SURGIFLO W/OUT THROMBIN - (6EA/CA)

## (undated) DEVICE — TUBE CONNECTING SUCTION - CLEAR PLASTIC STERILE 72 IN (50EA/CA)

## (undated) DEVICE — PROTECTOR ULNA NERVE - (36PR/CA)

## (undated) DEVICE — SUTURE GENERAL

## (undated) DEVICE — GLOVE SZ 7 BIOGEL PI MICRO - PF LF (50PR/BX 4BX/CA)